# Patient Record
Sex: FEMALE | Race: WHITE | HISPANIC OR LATINO | URBAN - METROPOLITAN AREA
[De-identification: names, ages, dates, MRNs, and addresses within clinical notes are randomized per-mention and may not be internally consistent; named-entity substitution may affect disease eponyms.]

---

## 2018-10-29 ENCOUNTER — INPATIENT (INPATIENT)
Facility: HOSPITAL | Age: 83
LOS: 3 days | Discharge: NURSING, REHAB OR LONG-TERM CARE | End: 2018-11-02
Attending: INTERNAL MEDICINE | Admitting: INTERNAL MEDICINE
Payer: MEDICARE

## 2018-10-29 VITALS
OXYGEN SATURATION: 99 % | SYSTOLIC BLOOD PRESSURE: 121 MMHG | RESPIRATION RATE: 16 BRPM | HEART RATE: 49 BPM | DIASTOLIC BLOOD PRESSURE: 46 MMHG

## 2018-10-29 DIAGNOSIS — R74.8 ABNORMAL LEVELS OF OTHER SERUM ENZYMES: ICD-10-CM

## 2018-10-29 LAB
ALBUMIN SERPL ELPH-MCNC: 3.8 G/DL — SIGNIFICANT CHANGE UP (ref 3.3–5)
ALP SERPL-CCNC: 122 U/L — HIGH (ref 40–120)
ALT FLD-CCNC: 14 U/L — SIGNIFICANT CHANGE UP (ref 4–33)
APPEARANCE UR: CLEAR — SIGNIFICANT CHANGE UP
AST SERPL-CCNC: 28 U/L — SIGNIFICANT CHANGE UP (ref 4–32)
BACTERIA # UR AUTO: NEGATIVE — SIGNIFICANT CHANGE UP
BASE EXCESS BLDV CALC-SCNC: -0.2 MMOL/L — SIGNIFICANT CHANGE UP
BASOPHILS # BLD AUTO: 0.05 K/UL — SIGNIFICANT CHANGE UP (ref 0–0.2)
BASOPHILS NFR BLD AUTO: 0.3 % — SIGNIFICANT CHANGE UP (ref 0–2)
BILIRUB SERPL-MCNC: 0.4 MG/DL — SIGNIFICANT CHANGE UP (ref 0.2–1.2)
BILIRUB UR-MCNC: NEGATIVE — SIGNIFICANT CHANGE UP
BLOOD GAS VENOUS - CREATININE: 0.84 MG/DL — SIGNIFICANT CHANGE UP (ref 0.5–1.3)
BLOOD UR QL VISUAL: NEGATIVE — SIGNIFICANT CHANGE UP
BUN SERPL-MCNC: 17 MG/DL — SIGNIFICANT CHANGE UP (ref 7–23)
CALCIUM SERPL-MCNC: 9.9 MG/DL — SIGNIFICANT CHANGE UP (ref 8.4–10.5)
CHLORIDE BLDV-SCNC: 95 MMOL/L — LOW (ref 96–108)
CHLORIDE SERPL-SCNC: 90 MMOL/L — LOW (ref 98–107)
CK MB BLD-MCNC: 1.7 — SIGNIFICANT CHANGE UP (ref 0–2.5)
CK MB BLD-MCNC: 14.69 NG/ML — HIGH (ref 1–4.7)
CK SERPL-CCNC: 870 U/L — HIGH (ref 25–170)
CO2 SERPL-SCNC: 20 MMOL/L — LOW (ref 22–31)
COLOR SPEC: YELLOW — SIGNIFICANT CHANGE UP
CREAT SERPL-MCNC: 0.78 MG/DL — SIGNIFICANT CHANGE UP (ref 0.5–1.3)
EOSINOPHIL # BLD AUTO: 0.01 K/UL — SIGNIFICANT CHANGE UP (ref 0–0.5)
EOSINOPHIL NFR BLD AUTO: 0.1 % — SIGNIFICANT CHANGE UP (ref 0–6)
GAS PNL BLDV: 136 MMOL/L — SIGNIFICANT CHANGE UP (ref 136–146)
GLUCOSE BLDV-MCNC: 157 — HIGH (ref 70–99)
GLUCOSE SERPL-MCNC: 160 MG/DL — HIGH (ref 70–99)
GLUCOSE UR-MCNC: NEGATIVE — SIGNIFICANT CHANGE UP
HCO3 BLDV-SCNC: 23 MMOL/L — SIGNIFICANT CHANGE UP (ref 20–27)
HCT VFR BLD CALC: 39.2 % — SIGNIFICANT CHANGE UP (ref 34.5–45)
HCT VFR BLDV CALC: 38 % — SIGNIFICANT CHANGE UP (ref 34.5–45)
HGB BLD-MCNC: 12 G/DL — SIGNIFICANT CHANGE UP (ref 11.5–15.5)
HGB BLDV-MCNC: 12.4 G/DL — SIGNIFICANT CHANGE UP (ref 11.5–15.5)
HYALINE CASTS # UR AUTO: NEGATIVE — SIGNIFICANT CHANGE UP
IMM GRANULOCYTES # BLD AUTO: 0.11 # — SIGNIFICANT CHANGE UP
IMM GRANULOCYTES NFR BLD AUTO: 0.7 % — SIGNIFICANT CHANGE UP (ref 0–1.5)
KETONES UR-MCNC: HIGH
LACTATE BLDV-MCNC: 3 MMOL/L — HIGH (ref 0.5–2)
LEUKOCYTE ESTERASE UR-ACNC: NEGATIVE — SIGNIFICANT CHANGE UP
LYMPHOCYTES # BLD AUTO: 0.57 K/UL — LOW (ref 1–3.3)
LYMPHOCYTES # BLD AUTO: 3.7 % — LOW (ref 13–44)
MAGNESIUM SERPL-MCNC: 2 MG/DL — SIGNIFICANT CHANGE UP (ref 1.6–2.6)
MCHC RBC-ENTMCNC: 25.5 PG — LOW (ref 27–34)
MCHC RBC-ENTMCNC: 30.6 % — LOW (ref 32–36)
MCV RBC AUTO: 83.2 FL — SIGNIFICANT CHANGE UP (ref 80–100)
MONOCYTES # BLD AUTO: 0.55 K/UL — SIGNIFICANT CHANGE UP (ref 0–0.9)
MONOCYTES NFR BLD AUTO: 3.6 % — SIGNIFICANT CHANGE UP (ref 2–14)
NEUTROPHILS # BLD AUTO: 13.95 K/UL — HIGH (ref 1.8–7.4)
NEUTROPHILS NFR BLD AUTO: 91.6 % — HIGH (ref 43–77)
NITRITE UR-MCNC: NEGATIVE — SIGNIFICANT CHANGE UP
NRBC # FLD: 0 — SIGNIFICANT CHANGE UP
PCO2 BLDV: 41 MMHG — SIGNIFICANT CHANGE UP (ref 41–51)
PH BLDV: 7.39 PH — SIGNIFICANT CHANGE UP (ref 7.32–7.43)
PH UR: 6.5 — SIGNIFICANT CHANGE UP (ref 5–8)
PHOSPHATE SERPL-MCNC: 3.9 MG/DL — SIGNIFICANT CHANGE UP (ref 2.5–4.5)
PLATELET # BLD AUTO: 542 K/UL — HIGH (ref 150–400)
PMV BLD: 8.9 FL — SIGNIFICANT CHANGE UP (ref 7–13)
PO2 BLDV: < 24 MMHG — LOW (ref 35–40)
POTASSIUM BLDV-SCNC: 3.2 MMOL/L — LOW (ref 3.4–4.5)
POTASSIUM SERPL-MCNC: 3.3 MMOL/L — LOW (ref 3.5–5.3)
POTASSIUM SERPL-SCNC: 3.3 MMOL/L — LOW (ref 3.5–5.3)
PROT SERPL-MCNC: 7.8 G/DL — SIGNIFICANT CHANGE UP (ref 6–8.3)
PROT UR-MCNC: 200 — HIGH
RBC # BLD: 4.71 M/UL — SIGNIFICANT CHANGE UP (ref 3.8–5.2)
RBC # FLD: 14.2 % — SIGNIFICANT CHANGE UP (ref 10.3–14.5)
RBC CASTS # UR COMP ASSIST: SIGNIFICANT CHANGE UP (ref 0–?)
SAO2 % BLDV: 18 % — LOW (ref 60–85)
SODIUM SERPL-SCNC: 135 MMOL/L — SIGNIFICANT CHANGE UP (ref 135–145)
SP GR SPEC: 1.02 — SIGNIFICANT CHANGE UP (ref 1–1.04)
SQUAMOUS # UR AUTO: SIGNIFICANT CHANGE UP
TROPONIN T, HIGH SENSITIVITY: 51 NG/L — SIGNIFICANT CHANGE UP (ref ?–14)
TROPONIN T, HIGH SENSITIVITY: 59 NG/L — CRITICAL HIGH (ref ?–14)
UROBILINOGEN FLD QL: NORMAL — SIGNIFICANT CHANGE UP
WBC # BLD: 15.24 K/UL — HIGH (ref 3.8–10.5)
WBC # FLD AUTO: 15.24 K/UL — HIGH (ref 3.8–10.5)
WBC UR QL: SIGNIFICANT CHANGE UP (ref 0–?)

## 2018-10-29 PROCEDURE — 72125 CT NECK SPINE W/O DYE: CPT | Mod: 26

## 2018-10-29 PROCEDURE — 71046 X-RAY EXAM CHEST 2 VIEWS: CPT | Mod: 26

## 2018-10-29 PROCEDURE — 70450 CT HEAD/BRAIN W/O DYE: CPT | Mod: 26

## 2018-10-29 RX ORDER — SODIUM CHLORIDE 9 MG/ML
1000 INJECTION INTRAMUSCULAR; INTRAVENOUS; SUBCUTANEOUS ONCE
Qty: 0 | Refills: 0 | Status: COMPLETED | OUTPATIENT
Start: 2018-10-29 | End: 2018-10-29

## 2018-10-29 RX ADMIN — SODIUM CHLORIDE 2000 MILLILITER(S): 9 INJECTION INTRAMUSCULAR; INTRAVENOUS; SUBCUTANEOUS at 15:16

## 2018-10-29 NOTE — ED ADULT NURSE NOTE - OBJECTIVE STATEMENT
Pt is an 86 year old female reporting a fall. Pt reports falling last night, was unable to get up and was found by family member this morning on the floor. Pt reports her legs "feeling weak then falling". Pt denies LOC, or hitting her head. Pt has a PMH of cva and breast cancer in the left breast. Pt reports left side weakness after CVA. Pt AO to place, situation but not time. Pt airway is patent, respirations are even. Pt denies any SOB, cheat pain. Pt hand grasp unequal, left hand weaker than right. Pt plantar flex and dorsal flex weak. Pt able to lift legs and hold for less than five seconds. Pt sensation intact, denies any numbness or tingling in extremities. Pt stage one pressure ulcer on coccyx, 2x3 inches, red. 20 g iv placed in right ac, labs drawn, pending review. Pt placed on cardiac monitor, will continue to monitor.

## 2018-10-29 NOTE — ED ADULT NURSE NOTE - NSIMPLEMENTINTERV_GEN_ALL_ED
Implemented All Fall with Harm Risk Interventions:  [place] to call system. Call bell, personal items and telephone within reach. Instruct patient to call for assistance. Room bathroom lighting operational. Non-slip footwear when patient is off stretcher. Physically safe environment: no spills, clutter or unnecessary equipment. Stretcher in lowest position, wheels locked, appropriate side rails in place. Provide visual cue, wrist band, yellow gown, etc. Monitor gait and stability. Monitor for mental status changes and reorient to person, place, and time. Review medications for side effects contributing to fall risk. Reinforce activity limits and safety measures with patient and family. Provide visual clues: red socks.

## 2018-10-29 NOTE — ED PROVIDER NOTE - ATTENDING CONTRIBUTION TO CARE
AJM: Patient seen with resident and agree with above note. 86F hx stroke with LUE deficits, htn not on blood thinners presents with fall last night. Pt says she lives alone and uses a walker at baseline. She fell when she got up from couch without walker, did not hit head or LOC and stayed down till the next morning 9AM when her brother who lives 5 blocks away found her. She denies any focal weakness, dizziness, palpitations, cp, sob before the fall. Also does not endorse pain anywhere, recent fevers/chills, dysuria, abdominal pain, diarrhea or n/v. will obtain labs, xrays, trop, ecg. possible admit

## 2018-10-29 NOTE — ED PROVIDER NOTE - OBJECTIVE STATEMENT
86F hx stroke with LUE deficits, htn not on blood thinners presents with fall last night. Pt says she lives alone and uses a walker at baseline. She fell when she got up from couch without walker, did not hit head or LOC and stayed down till the next morning 9AM when her brother who lives 5 blocks away found her. She denies any focal weakness, dizziness, palpitations, cp, sob before the fall. Also does not endorse pain anywhere, recent fevers/chills, dysuria, abdominal pain, diarrhea or n/v.

## 2018-10-29 NOTE — ED ADULT TRIAGE NOTE - CHIEF COMPLAINT QUOTE
pt BIBA from home, pt was found on floor this am.  pt c/o weakness to BLE.  unknown how long pt was on the floor.  PMH: breast ca, CVA.  unable to obtain temp in triage

## 2018-10-29 NOTE — ED PROVIDER NOTE - NS ED ROS FT
Constitutional: no fevers, no chills.  Eyes: no visual changes.  Ears: no ear drainage, no ear pain.  Nose: no nasal congestion.  Mouth/Throat: no sore throat.  Cardiovascular: no chest pain.  Respiratory: no shortness of breath, no wheezing, no cough  Gastrointestinal: no nausea, no vomiting, no diarrhea, no abdominal pain.  MSK: no flank pain, no back pain. +fall   Genitourinary: no dysuria, no hematuria.  Skin: no rashes.  Neuro: no headache,   Psychiatric: no known mental health issues.

## 2018-10-29 NOTE — ED ADULT NURSE NOTE - CHPI ED NUR SYMPTOMS NEG
no bleeding/no vomiting/no tingling/no abrasion/no loss of consciousness/no numbness/no fever/no deformity

## 2018-10-29 NOTE — ED ADULT NURSE REASSESSMENT NOTE - NS ED NURSE REASSESS COMMENT FT1
Patient A&Ox2 awaiting cardio consult tba, on montior in NAD, repositioned assisted to bedpan but unable to urinate, abdomen soft and non-distended, diaper changed, mepilix applied over bony prominence in sacrum skin intact appears to have Stage 1 pressure.

## 2018-10-29 NOTE — ED PROVIDER NOTE - PHYSICAL EXAMINATION
GEN: Well appearing, well nourished, in no apparent distress.  HEAD: NCAT  HEENT: L facial fold more flattened than R, PERRL, EOMI, no nystagmus, Airway patent, uvula midline, MMM, neck supple, no LAD, no JVD  LUNG: CTAB, no adventitious sounds, no retractions, no nasal flaring  CV: RRR, no murmurs,   Abd: soft, NTND, no rebound or guarding, BS+ in all quadrants, no CVAT  MSK: WWP, Pulses 2+ in extremities, No edema, no visible deformities, strength 5/5 in all extremities except 3/5 in LUE.   Neuro:  CN II-XII in tact. AAOx3, non-ambulatory. sensations in tact in all extremities, neg pronator drift, neg finger to nose,   Skin: Warm and dry, no evidence of rash  Psych: normal mood and affect

## 2018-10-29 NOTE — ED PROVIDER NOTE - MEDICAL DECISION MAKING DETAILS
CT head and spine to r/o bleed and fracture. Will get CK and Ua as suspicious of dehydration and rhabdo - will require ivf. EKG and trops to look for cardiac e/o. Cardiology will see pt and then we can admit for delta trops. Pending CTH and neck.

## 2018-10-29 NOTE — ED PROVIDER NOTE - CARE PLAN
Principal Discharge DX:	Troponin level elevated  Secondary Diagnosis:	Traumatic rhabdomyolysis, initial encounter
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
## 2018-10-30 DIAGNOSIS — T79.6XXA TRAUMATIC ISCHEMIA OF MUSCLE, INITIAL ENCOUNTER: ICD-10-CM

## 2018-10-30 DIAGNOSIS — R74.8 ABNORMAL LEVELS OF OTHER SERUM ENZYMES: ICD-10-CM

## 2018-10-30 DIAGNOSIS — Z90.12 ACQUIRED ABSENCE OF LEFT BREAST AND NIPPLE: Chronic | ICD-10-CM

## 2018-10-30 DIAGNOSIS — E78.5 HYPERLIPIDEMIA, UNSPECIFIED: ICD-10-CM

## 2018-10-30 DIAGNOSIS — Z79.899 OTHER LONG TERM (CURRENT) DRUG THERAPY: ICD-10-CM

## 2018-10-30 DIAGNOSIS — D72.829 ELEVATED WHITE BLOOD CELL COUNT, UNSPECIFIED: ICD-10-CM

## 2018-10-30 DIAGNOSIS — H26.40 UNSPECIFIED SECONDARY CATARACT: Chronic | ICD-10-CM

## 2018-10-30 DIAGNOSIS — Z29.9 ENCOUNTER FOR PROPHYLACTIC MEASURES, UNSPECIFIED: ICD-10-CM

## 2018-10-30 DIAGNOSIS — I10 ESSENTIAL (PRIMARY) HYPERTENSION: ICD-10-CM

## 2018-10-30 LAB
BUN SERPL-MCNC: 20 MG/DL — SIGNIFICANT CHANGE UP (ref 7–23)
CALCIUM SERPL-MCNC: 9.2 MG/DL — SIGNIFICANT CHANGE UP (ref 8.4–10.5)
CHLORIDE SERPL-SCNC: 96 MMOL/L — LOW (ref 98–107)
CHOLEST SERPL-MCNC: 186 MG/DL — SIGNIFICANT CHANGE UP (ref 120–199)
CK MB BLD-MCNC: 1.5 — SIGNIFICANT CHANGE UP (ref 0–2.5)
CK MB BLD-MCNC: 10.23 NG/ML — HIGH (ref 1–4.7)
CK SERPL-CCNC: 685 U/L — HIGH (ref 25–170)
CO2 SERPL-SCNC: 23 MMOL/L — SIGNIFICANT CHANGE UP (ref 22–31)
CREAT SERPL-MCNC: 0.72 MG/DL — SIGNIFICANT CHANGE UP (ref 0.5–1.3)
GLUCOSE SERPL-MCNC: 93 MG/DL — SIGNIFICANT CHANGE UP (ref 70–99)
HBA1C BLD-MCNC: 5.9 % — HIGH (ref 4–5.6)
HCT VFR BLD CALC: 33.3 % — LOW (ref 34.5–45)
HDLC SERPL-MCNC: 55 MG/DL — SIGNIFICANT CHANGE UP (ref 45–65)
HGB BLD-MCNC: 10.4 G/DL — LOW (ref 11.5–15.5)
LIPID PNL WITH DIRECT LDL SERPL: 119 MG/DL — SIGNIFICANT CHANGE UP
MAGNESIUM SERPL-MCNC: 2.1 MG/DL — SIGNIFICANT CHANGE UP (ref 1.6–2.6)
MCHC RBC-ENTMCNC: 25.3 PG — LOW (ref 27–34)
MCHC RBC-ENTMCNC: 31.2 % — LOW (ref 32–36)
MCV RBC AUTO: 81 FL — SIGNIFICANT CHANGE UP (ref 80–100)
NRBC # FLD: 0 — SIGNIFICANT CHANGE UP
PHOSPHATE SERPL-MCNC: 3 MG/DL — SIGNIFICANT CHANGE UP (ref 2.5–4.5)
PLATELET # BLD AUTO: 524 K/UL — HIGH (ref 150–400)
PMV BLD: 8.9 FL — SIGNIFICANT CHANGE UP (ref 7–13)
POTASSIUM SERPL-MCNC: 3.2 MMOL/L — LOW (ref 3.5–5.3)
POTASSIUM SERPL-SCNC: 3.2 MMOL/L — LOW (ref 3.5–5.3)
RBC # BLD: 4.11 M/UL — SIGNIFICANT CHANGE UP (ref 3.8–5.2)
RBC # FLD: 14.5 % — SIGNIFICANT CHANGE UP (ref 10.3–14.5)
SODIUM SERPL-SCNC: 138 MMOL/L — SIGNIFICANT CHANGE UP (ref 135–145)
TRIGL SERPL-MCNC: 97 MG/DL — SIGNIFICANT CHANGE UP (ref 10–149)
TROPONIN T, HIGH SENSITIVITY: 46 NG/L — SIGNIFICANT CHANGE UP (ref ?–14)
TSH SERPL-MCNC: 2.51 UIU/ML — SIGNIFICANT CHANGE UP (ref 0.27–4.2)
WBC # BLD: 10.06 K/UL — SIGNIFICANT CHANGE UP (ref 3.8–10.5)
WBC # FLD AUTO: 10.06 K/UL — SIGNIFICANT CHANGE UP (ref 3.8–10.5)

## 2018-10-30 RX ORDER — HEPARIN SODIUM 5000 [USP'U]/ML
5000 INJECTION INTRAVENOUS; SUBCUTANEOUS EVERY 12 HOURS
Qty: 0 | Refills: 0 | Status: DISCONTINUED | OUTPATIENT
Start: 2018-10-30 | End: 2018-11-02

## 2018-10-30 RX ORDER — SIMVASTATIN 20 MG/1
20 TABLET, FILM COATED ORAL AT BEDTIME
Qty: 0 | Refills: 0 | Status: DISCONTINUED | OUTPATIENT
Start: 2018-10-30 | End: 2018-11-02

## 2018-10-30 RX ORDER — SODIUM CHLORIDE 9 MG/ML
1000 INJECTION INTRAMUSCULAR; INTRAVENOUS; SUBCUTANEOUS
Qty: 0 | Refills: 0 | Status: DISCONTINUED | OUTPATIENT
Start: 2018-10-30 | End: 2018-11-02

## 2018-10-30 RX ORDER — HYDROCHLOROTHIAZIDE 25 MG
25 TABLET ORAL DAILY
Qty: 0 | Refills: 0 | Status: DISCONTINUED | OUTPATIENT
Start: 2018-10-30 | End: 2018-11-02

## 2018-10-30 RX ORDER — POTASSIUM CHLORIDE 20 MEQ
40 PACKET (EA) ORAL EVERY 4 HOURS
Qty: 0 | Refills: 0 | Status: COMPLETED | OUTPATIENT
Start: 2018-10-30 | End: 2018-10-30

## 2018-10-30 RX ORDER — INFLUENZA VIRUS VACCINE 15; 15; 15; 15 UG/.5ML; UG/.5ML; UG/.5ML; UG/.5ML
0.5 SUSPENSION INTRAMUSCULAR ONCE
Qty: 0 | Refills: 0 | Status: COMPLETED | OUTPATIENT
Start: 2018-10-30 | End: 2018-11-02

## 2018-10-30 RX ORDER — POTASSIUM CHLORIDE 20 MEQ
1 PACKET (EA) ORAL
Qty: 0 | Refills: 0 | COMMUNITY

## 2018-10-30 RX ORDER — SODIUM CHLORIDE 9 MG/ML
3 INJECTION INTRAMUSCULAR; INTRAVENOUS; SUBCUTANEOUS EVERY 8 HOURS
Qty: 0 | Refills: 0 | Status: DISCONTINUED | OUTPATIENT
Start: 2018-10-30 | End: 2018-11-02

## 2018-10-30 RX ORDER — METOPROLOL TARTRATE 50 MG
25 TABLET ORAL DAILY
Qty: 0 | Refills: 0 | Status: DISCONTINUED | OUTPATIENT
Start: 2018-10-30 | End: 2018-10-31

## 2018-10-30 RX ORDER — POTASSIUM CHLORIDE 20 MEQ
10 PACKET (EA) ORAL DAILY
Qty: 0 | Refills: 0 | Status: DISCONTINUED | OUTPATIENT
Start: 2018-10-30 | End: 2018-11-02

## 2018-10-30 RX ADMIN — SODIUM CHLORIDE 3 MILLILITER(S): 9 INJECTION INTRAMUSCULAR; INTRAVENOUS; SUBCUTANEOUS at 15:29

## 2018-10-30 RX ADMIN — SODIUM CHLORIDE 75 MILLILITER(S): 9 INJECTION INTRAMUSCULAR; INTRAVENOUS; SUBCUTANEOUS at 16:14

## 2018-10-30 RX ADMIN — Medication 40 MILLIEQUIVALENT(S): at 11:31

## 2018-10-30 RX ADMIN — Medication 25 MILLIGRAM(S): at 20:28

## 2018-10-30 RX ADMIN — SODIUM CHLORIDE 3 MILLILITER(S): 9 INJECTION INTRAMUSCULAR; INTRAVENOUS; SUBCUTANEOUS at 22:30

## 2018-10-30 RX ADMIN — SODIUM CHLORIDE 3 MILLILITER(S): 9 INJECTION INTRAMUSCULAR; INTRAVENOUS; SUBCUTANEOUS at 07:12

## 2018-10-30 RX ADMIN — HEPARIN SODIUM 5000 UNIT(S): 5000 INJECTION INTRAVENOUS; SUBCUTANEOUS at 07:12

## 2018-10-30 RX ADMIN — Medication 40 MILLIEQUIVALENT(S): at 15:42

## 2018-10-30 RX ADMIN — SIMVASTATIN 20 MILLIGRAM(S): 20 TABLET, FILM COATED ORAL at 22:43

## 2018-10-30 RX ADMIN — HEPARIN SODIUM 5000 UNIT(S): 5000 INJECTION INTRAVENOUS; SUBCUTANEOUS at 17:24

## 2018-10-30 RX ADMIN — Medication 10 MILLIEQUIVALENT(S): at 17:24

## 2018-10-30 NOTE — PHYSICAL THERAPY INITIAL EVALUATION ADULT - ADDITIONAL COMMENTS
Patient reports she lives alone in a private house, 5 steps to enter. Patient reports she was previously independent in all ADLs (although mentioned her brother would come over to help) and ambulated with a cane & rolling walker prior to admission.     Patient was left semi-supine in bed as found, all lines/tubes intact and call richey within reach, RHONA guillen

## 2018-10-30 NOTE — PHYSICAL THERAPY INITIAL EVALUATION ADULT - PATIENT PROFILE REVIEW, REHAB EVAL
PT orders received-->no formal activity order. Consult with RHONA Argueta V -->pt OK to participate in PT evaluation./yes

## 2018-10-30 NOTE — H&P ADULT - PROBLEM SELECTOR PLAN 1
Admit to tele  check cbc, bmp, a1c, flp, tsh, trend CE  Echo ordered   fall risk  PT  check orthostatics

## 2018-10-30 NOTE — H&P ADULT - ATTENDING COMMENTS
Pt seen and examined at bedside. Agree with assessment and plan   admitted with fall and rhabdomyolysis   Gentle iVF   trend Trops and CE   Check TTE   Orthostatics   Tele monitoring

## 2018-10-30 NOTE — PHYSICAL THERAPY INITIAL EVALUATION ADULT - PERTINENT HX OF CURRENT PROBLEM, REHAB EVAL
Patient is an 86 year old female admitted to [facility] on 10/29 s/p fall at home. PMH includes: HTN, HLD, CVA with LUE weakness, breast ca s/p L mastectomy. CTH negative for acute infarct.  Cervical spine CT: No evidence for acute displaced fracture or traumatic malalignment.

## 2018-10-30 NOTE — H&P ADULT - NSHPLABSRESULTS_GEN_ALL_CORE
EKG NSR 86 BPM, QTC: 624                          12.0   15.24 )-----------( 542      ( 29 Oct 2018 15:00 )             39.2     10-29    135  |  90<L>  |  17  ----------------------------<  160<H>  3.3<L>   |  20<L>  |  0.78    Ca    9.9      29 Oct 2018 15:00  Phos  3.9     10-29  Mg     2.0     10-29    TPro  7.8  /  Alb  3.8  /  TBili  0.4  /  DBili  x   /  AST  28  /  ALT  14  /  AlkPhos  122<H>  10-29    Troponin T, High Sensitivity: 51: ---------------------***PLEASE NOTE***----------------------  Rapid changes upward or downward in high-sensitivity  troponin levels strongly suggest acute myocardial injury.  Hemolysis may falsely lower results. Renal impairment may  increase results.    Normal: <6 - 14 ng/L  Indeterminate: 15 - 51 ng/L  Elevated: >51 ng/L    Please see "[email or web address] on the [employer]  intranet for more information. ng/L (10.29.18 @ 16:23)    < from: CT Cervical Spine No Cont (10.29.18 @ 18:31) >      Head CT: No evidence for intracranial hemorrhage, mass effect, or   displaced calvarial fracture.    Cervical spine CT: No evidence for acute displaced fracture or traumatic   malalignment. Cervical degenerative spondylosis, as described above. MRI   can be performed if there is concern for ligamentous or cord injury, and   if there are no MRI contraindications.    < end of copied text >

## 2018-10-31 LAB
ALBUMIN SERPL ELPH-MCNC: 3.3 G/DL — SIGNIFICANT CHANGE UP (ref 3.3–5)
ALP SERPL-CCNC: 96 U/L — SIGNIFICANT CHANGE UP (ref 40–120)
ALT FLD-CCNC: 11 U/L — SIGNIFICANT CHANGE UP (ref 4–33)
AST SERPL-CCNC: 25 U/L — SIGNIFICANT CHANGE UP (ref 4–32)
BACTERIA UR CULT: SIGNIFICANT CHANGE UP
BILIRUB DIRECT SERPL-MCNC: 0.1 MG/DL — SIGNIFICANT CHANGE UP (ref 0.1–0.2)
BILIRUB SERPL-MCNC: 0.4 MG/DL — SIGNIFICANT CHANGE UP (ref 0.2–1.2)
BLD GP AB SCN SERPL QL: NEGATIVE — SIGNIFICANT CHANGE UP
BUN SERPL-MCNC: 13 MG/DL — SIGNIFICANT CHANGE UP (ref 7–23)
CALCIUM SERPL-MCNC: 9.2 MG/DL — SIGNIFICANT CHANGE UP (ref 8.4–10.5)
CHLORIDE SERPL-SCNC: 94 MMOL/L — LOW (ref 98–107)
CK SERPL-CCNC: 364 U/L — HIGH (ref 25–170)
CO2 SERPL-SCNC: 21 MMOL/L — LOW (ref 22–31)
CREAT SERPL-MCNC: 0.64 MG/DL — SIGNIFICANT CHANGE UP (ref 0.5–1.3)
GLUCOSE SERPL-MCNC: 115 MG/DL — HIGH (ref 70–99)
HCT VFR BLD CALC: 37.5 % — SIGNIFICANT CHANGE UP (ref 34.5–45)
HGB BLD-MCNC: 11.6 G/DL — SIGNIFICANT CHANGE UP (ref 11.5–15.5)
MAGNESIUM SERPL-MCNC: 2 MG/DL — SIGNIFICANT CHANGE UP (ref 1.6–2.6)
MCHC RBC-ENTMCNC: 25.7 PG — LOW (ref 27–34)
MCHC RBC-ENTMCNC: 30.9 % — LOW (ref 32–36)
MCV RBC AUTO: 83.1 FL — SIGNIFICANT CHANGE UP (ref 80–100)
NRBC # FLD: 0 — SIGNIFICANT CHANGE UP
PLATELET # BLD AUTO: 532 K/UL — HIGH (ref 150–400)
PMV BLD: 8.8 FL — SIGNIFICANT CHANGE UP (ref 7–13)
POTASSIUM SERPL-MCNC: 3.1 MMOL/L — LOW (ref 3.5–5.3)
POTASSIUM SERPL-SCNC: 3.1 MMOL/L — LOW (ref 3.5–5.3)
PROT SERPL-MCNC: 6.9 G/DL — SIGNIFICANT CHANGE UP (ref 6–8.3)
RBC # BLD: 4.51 M/UL — SIGNIFICANT CHANGE UP (ref 3.8–5.2)
RBC # FLD: 14.6 % — HIGH (ref 10.3–14.5)
RH IG SCN BLD-IMP: POSITIVE — SIGNIFICANT CHANGE UP
SODIUM SERPL-SCNC: 135 MMOL/L — SIGNIFICANT CHANGE UP (ref 135–145)
SPECIMEN SOURCE: SIGNIFICANT CHANGE UP
WBC # BLD: 10.75 K/UL — HIGH (ref 3.8–10.5)
WBC # FLD AUTO: 10.75 K/UL — HIGH (ref 3.8–10.5)

## 2018-10-31 RX ORDER — ACETAMINOPHEN 500 MG
650 TABLET ORAL ONCE
Qty: 0 | Refills: 0 | Status: COMPLETED | OUTPATIENT
Start: 2018-10-31 | End: 2018-10-31

## 2018-10-31 RX ORDER — METOPROLOL TARTRATE 50 MG
25 TABLET ORAL ONCE
Qty: 0 | Refills: 0 | Status: COMPLETED | OUTPATIENT
Start: 2018-10-31 | End: 2018-10-31

## 2018-10-31 RX ORDER — POTASSIUM CHLORIDE 20 MEQ
40 PACKET (EA) ORAL EVERY 4 HOURS
Qty: 0 | Refills: 0 | Status: COMPLETED | OUTPATIENT
Start: 2018-10-31 | End: 2018-10-31

## 2018-10-31 RX ORDER — METOPROLOL TARTRATE 50 MG
50 TABLET ORAL DAILY
Qty: 0 | Refills: 0 | Status: DISCONTINUED | OUTPATIENT
Start: 2018-10-31 | End: 2018-11-02

## 2018-10-31 RX ADMIN — Medication 650 MILLIGRAM(S): at 13:00

## 2018-10-31 RX ADMIN — SODIUM CHLORIDE 3 MILLILITER(S): 9 INJECTION INTRAMUSCULAR; INTRAVENOUS; SUBCUTANEOUS at 05:57

## 2018-10-31 RX ADMIN — Medication 650 MILLIGRAM(S): at 22:45

## 2018-10-31 RX ADMIN — Medication 10 MILLIEQUIVALENT(S): at 12:07

## 2018-10-31 RX ADMIN — Medication 650 MILLIGRAM(S): at 21:46

## 2018-10-31 RX ADMIN — Medication 25 MILLIGRAM(S): at 06:11

## 2018-10-31 RX ADMIN — SIMVASTATIN 20 MILLIGRAM(S): 20 TABLET, FILM COATED ORAL at 21:46

## 2018-10-31 RX ADMIN — Medication 25 MILLIGRAM(S): at 06:06

## 2018-10-31 RX ADMIN — SODIUM CHLORIDE 3 MILLILITER(S): 9 INJECTION INTRAMUSCULAR; INTRAVENOUS; SUBCUTANEOUS at 21:46

## 2018-10-31 RX ADMIN — Medication 650 MILLIGRAM(S): at 12:06

## 2018-10-31 RX ADMIN — HEPARIN SODIUM 5000 UNIT(S): 5000 INJECTION INTRAVENOUS; SUBCUTANEOUS at 06:07

## 2018-10-31 RX ADMIN — HEPARIN SODIUM 5000 UNIT(S): 5000 INJECTION INTRAVENOUS; SUBCUTANEOUS at 19:10

## 2018-10-31 RX ADMIN — Medication 40 MILLIEQUIVALENT(S): at 09:52

## 2018-10-31 RX ADMIN — SODIUM CHLORIDE 3 MILLILITER(S): 9 INJECTION INTRAMUSCULAR; INTRAVENOUS; SUBCUTANEOUS at 14:36

## 2018-10-31 RX ADMIN — Medication 25 MILLIGRAM(S): at 10:01

## 2018-10-31 RX ADMIN — Medication 40 MILLIEQUIVALENT(S): at 06:06

## 2018-10-31 NOTE — CONSULT NOTE ADULT - ASSESSMENT
85 yo female h/o cva, htn, chol, breast ca admitted s/p fall with rhabdo  r/o syncope      no evid of acs  f/u echo  tele monitor  check orthostatics  cards f/u  brain ct neg    rhabdo  improving with ivf    cont other prior home meds    PT

## 2018-10-31 NOTE — CONSULT NOTE ADULT - SUBJECTIVE AND OBJECTIVE BOX
FELIPE JOSEPH  86y Female  MRN:[id number]    Patient is a 86y old  Female who presents with a chief complaint of fall (30 Oct 2018 05:42)    HPI:  85 y/o F with hx of HTN, HLD, CVA with LUE weakness, breast ca s/p L mastectomy presents with fall. She got up from the couch without the walker and fell to the floor. She did not hit head or had LOC. She was unable to get up from there and stayed on the floor until the AM when her brother found her. Denies fever, chills, cough, chest pain, sob, abdominal pain, nausea, vomiting, melena, hematochezia, LE edema, or dysuria. (30 Oct 2018 05:42)      Patient seen and evaluated at bedside.      Interval HPI: no acute events o/n    PAST MEDICAL & SURGICAL HISTORY:  Breast cancer  Hyperlipidemia  Hypertension  Cerebrovascular accident (CVA)  After cataract  H/O left mastectomy    SOC:  non smoker  no alcohol or drug abuse    fam hx:  non cont    REVIEW OF SYSTEMS:  as per hpi    VITALS:  Vital Signs Last 24 Hrs  T(C): 37.4 (31 Oct 2018 05:55), Max: 37.4 (31 Oct 2018 05:55)  T(F): 99.3 (31 Oct 2018 05:55), Max: 99.3 (31 Oct 2018 05:55)  HR: 94 (31 Oct 2018 10:00) (90 - 110)  BP: 148/84 (31 Oct 2018 10:00) (121/67 - 151/85)  BP(mean): 131 (31 Oct 2018 05:55) (131 - 131)  RR: 18 (31 Oct 2018 10:00) (17 - 18)  SpO2: 97% (31 Oct 2018 10:00) (95% - 99%)  CAPILLARY BLOOD GLUCOSE        I&O's Summary      PHYSICAL EXAM:  GENERAL: NAD, well-developed  HEAD:  Atraumatic, Normocephalic  EYES: EOMI, PERRLA, conjunctiva and sclera clear  NECK: Supple, No JVD  CHEST/LUNG: Clear to auscultation bilaterally; No wheeze  HEART: S1, S2; No murmurs, rubs, or gallops  ABDOMEN: Soft, Nontender, Nondistended; Bowel sounds present  EXTREMITIES:  2+ Peripheral Pulses, No clubbing, cyanosis, or edema  PSYCH: Normal affect  NEUROLOGY: AAOX3; LUE weakness  SKIN: No rashes or lesions    Consultant(s) Notes Reviewed:  [x ] YES  [ ] NO  Care Discussed with Consultants/Other Providers [ x] YES  [ ] NO    MEDS:  MEDICATIONS  (STANDING):  heparin  Injectable 5000 Unit(s) SubCutaneous every 12 hours  hydrochlorothiazide 25 milliGRAM(s) Oral daily  influenza   Vaccine 0.5 milliLiter(s) IntraMuscular once  metoprolol succinate ER 50 milliGRAM(s) Oral daily  potassium chloride    Tablet ER 10 milliEquivalent(s) Oral daily  simvastatin 20 milliGRAM(s) Oral at bedtime  sodium chloride 0.9% lock flush 3 milliLiter(s) IV Push every 8 hours  sodium chloride 0.9%. 1000 milliLiter(s) (75 mL/Hr) IV Continuous <Continuous>    MEDICATIONS  (PRN):    ALLERGIES:  penicillin (Unknown)      LABS:                        11.6   10.75 )-----------( 532      ( 31 Oct 2018 02:20 )             37.5     10-31    135  |  94<L>  |  13  ----------------------------<  115<H>  3.1<L>   |  21<L>  |  0.64    Ca    9.2      31 Oct 2018 02:20  Phos  3.0     10-  Mg     2.0     10-31    TPro  6.9  /  Alb  3.3  /  TBili  0.4  /  DBili  0.1  /  AST  25  /  ALT  11  /  AlkPhos  96  10-31      CARDIAC MARKERS ( 31 Oct 2018 02:20 )  x     / x     / 364 u/L / x     / x      CARDIAC MARKERS ( 30 Oct 2018 05:30 )  x     / x     / 685 u/L / 10.23 ng/mL / x      CARDIAC MARKERS ( 29 Oct 2018 15:00 )  x     / x     / 870 u/L / 14.69 ng/mL / x          LIVER FUNCTIONS - ( 31 Oct 2018 02:20 )  Alb: 3.3 g/dL / Pro: 6.9 g/dL / ALK PHOS: 96 u/L / ALT: 11 u/L / AST: 25 u/L / GGT: x           Urinalysis Basic - ( 29 Oct 2018 15:30 )    Color: YELLOW / Appearance: CLEAR / S.019 / pH: 6.5  Gluc: NEGATIVE / Ketone: MODERATE  / Bili: NEGATIVE / Urobili: NORMAL   Blood: NEGATIVE / Protein: 200 / Nitrite: NEGATIVE   Leuk Esterase: NEGATIVE / RBC: 3-5 / WBC 0-2   Sq Epi: OCC / Non Sq Epi: x / Bacteria: NEGATIVE      TSH:   A1c:  BNP:  Lipid panel:   cultures:     RADIOLOGY & ADDITIONAL TESTS:    Imaging Personally Reviewed:  [ ] YES  [ ] NO

## 2018-11-01 LAB
BASOPHILS # BLD AUTO: 0.05 K/UL — SIGNIFICANT CHANGE UP (ref 0–0.2)
BASOPHILS NFR BLD AUTO: 0.5 % — SIGNIFICANT CHANGE UP (ref 0–2)
BUN SERPL-MCNC: 10 MG/DL — SIGNIFICANT CHANGE UP (ref 7–23)
CALCIUM SERPL-MCNC: 8.7 MG/DL — SIGNIFICANT CHANGE UP (ref 8.4–10.5)
CHLORIDE SERPL-SCNC: 100 MMOL/L — SIGNIFICANT CHANGE UP (ref 98–107)
CO2 SERPL-SCNC: 24 MMOL/L — SIGNIFICANT CHANGE UP (ref 22–31)
CREAT SERPL-MCNC: 0.63 MG/DL — SIGNIFICANT CHANGE UP (ref 0.5–1.3)
EOSINOPHIL # BLD AUTO: 0.04 K/UL — SIGNIFICANT CHANGE UP (ref 0–0.5)
EOSINOPHIL NFR BLD AUTO: 0.4 % — SIGNIFICANT CHANGE UP (ref 0–6)
GLUCOSE SERPL-MCNC: 109 MG/DL — HIGH (ref 70–99)
HCT VFR BLD CALC: 34.7 % — SIGNIFICANT CHANGE UP (ref 34.5–45)
HGB BLD-MCNC: 11 G/DL — LOW (ref 11.5–15.5)
IMM GRANULOCYTES # BLD AUTO: 0.08 # — SIGNIFICANT CHANGE UP
IMM GRANULOCYTES NFR BLD AUTO: 0.8 % — SIGNIFICANT CHANGE UP (ref 0–1.5)
LYMPHOCYTES # BLD AUTO: 0.88 K/UL — LOW (ref 1–3.3)
LYMPHOCYTES # BLD AUTO: 8.3 % — LOW (ref 13–44)
MCHC RBC-ENTMCNC: 25.2 PG — LOW (ref 27–34)
MCHC RBC-ENTMCNC: 31.7 % — LOW (ref 32–36)
MCV RBC AUTO: 79.6 FL — LOW (ref 80–100)
MONOCYTES # BLD AUTO: 0.8 K/UL — SIGNIFICANT CHANGE UP (ref 0–0.9)
MONOCYTES NFR BLD AUTO: 7.6 % — SIGNIFICANT CHANGE UP (ref 2–14)
NEUTROPHILS # BLD AUTO: 8.73 K/UL — HIGH (ref 1.8–7.4)
NEUTROPHILS NFR BLD AUTO: 82.4 % — HIGH (ref 43–77)
NRBC # FLD: 0 — SIGNIFICANT CHANGE UP
PLATELET # BLD AUTO: 492 K/UL — HIGH (ref 150–400)
PMV BLD: 9.1 FL — SIGNIFICANT CHANGE UP (ref 7–13)
POTASSIUM SERPL-MCNC: 3 MMOL/L — LOW (ref 3.5–5.3)
POTASSIUM SERPL-SCNC: 3 MMOL/L — LOW (ref 3.5–5.3)
RBC # BLD: 4.36 M/UL — SIGNIFICANT CHANGE UP (ref 3.8–5.2)
RBC # FLD: 14.4 % — SIGNIFICANT CHANGE UP (ref 10.3–14.5)
SODIUM SERPL-SCNC: 137 MMOL/L — SIGNIFICANT CHANGE UP (ref 135–145)
WBC # BLD: 10.58 K/UL — HIGH (ref 3.8–10.5)
WBC # FLD AUTO: 10.58 K/UL — HIGH (ref 3.8–10.5)

## 2018-11-01 RX ORDER — POTASSIUM CHLORIDE 20 MEQ
40 PACKET (EA) ORAL EVERY 4 HOURS
Qty: 0 | Refills: 0 | Status: COMPLETED | OUTPATIENT
Start: 2018-11-01 | End: 2018-11-01

## 2018-11-01 RX ADMIN — HEPARIN SODIUM 5000 UNIT(S): 5000 INJECTION INTRAVENOUS; SUBCUTANEOUS at 17:56

## 2018-11-01 RX ADMIN — SODIUM CHLORIDE 3 MILLILITER(S): 9 INJECTION INTRAMUSCULAR; INTRAVENOUS; SUBCUTANEOUS at 06:04

## 2018-11-01 RX ADMIN — SODIUM CHLORIDE 3 MILLILITER(S): 9 INJECTION INTRAMUSCULAR; INTRAVENOUS; SUBCUTANEOUS at 21:38

## 2018-11-01 RX ADMIN — SIMVASTATIN 20 MILLIGRAM(S): 20 TABLET, FILM COATED ORAL at 21:47

## 2018-11-01 RX ADMIN — SODIUM CHLORIDE 75 MILLILITER(S): 9 INJECTION INTRAMUSCULAR; INTRAVENOUS; SUBCUTANEOUS at 17:56

## 2018-11-01 RX ADMIN — SODIUM CHLORIDE 3 MILLILITER(S): 9 INJECTION INTRAMUSCULAR; INTRAVENOUS; SUBCUTANEOUS at 13:25

## 2018-11-01 RX ADMIN — Medication 10 MILLIEQUIVALENT(S): at 12:12

## 2018-11-01 RX ADMIN — Medication 40 MILLIEQUIVALENT(S): at 21:47

## 2018-11-01 RX ADMIN — Medication 40 MILLIEQUIVALENT(S): at 12:31

## 2018-11-01 RX ADMIN — Medication 50 MILLIGRAM(S): at 05:49

## 2018-11-01 RX ADMIN — HEPARIN SODIUM 5000 UNIT(S): 5000 INJECTION INTRAVENOUS; SUBCUTANEOUS at 05:50

## 2018-11-01 RX ADMIN — Medication 40 MILLIEQUIVALENT(S): at 16:43

## 2018-11-01 RX ADMIN — Medication 25 MILLIGRAM(S): at 05:49

## 2018-11-01 NOTE — SWALLOW BEDSIDE ASSESSMENT ADULT - SWALLOW EVAL: DIAGNOSIS
Patient demonstrated a functional oral stage of swallow and a suspected mild pharyngeal dysphagia characterized by functional bolus collection, manipulation and transfer, a timely pharyngeal trigger, and suspect reduced hyolaryngeal elevation upon digital palpation resulting in an immediate cough post swallow with thin liquids suggestive of laryngeal penetration vs aspiration. No overt clinical s/s noted with puree, solids and nectar-thick liquids.

## 2018-11-01 NOTE — SWALLOW BEDSIDE ASSESSMENT ADULT - ASR SWALLOW ASPIRATION MONITOR
fever/pneumonia/oral hygiene/position upright (90Y)/throat clearing/upper respiratory infection/change of breathing pattern/cough/gurgly voice

## 2018-11-01 NOTE — SWALLOW BEDSIDE ASSESSMENT ADULT - COMMENTS
Patient was received awake, alert and cooperative this PM. Patient is an 86 year old female with PMHx of HTN, HLD, CVA with LUE weakness, breast ca s/p L mastectomy, presents with fall. CT: Head CT: No evidence for intracranial hemorrhage, mass effect, or displaced calvarial fracture.    Patient was received awake, alert and cooperative this AM. Patient reported difficulty swallowing whole pills and that she sometimes coughs with water. Recommendations discussed with primary RN. Tele PA paged; awaiting call back at this time.

## 2018-11-01 NOTE — SWALLOW BEDSIDE ASSESSMENT ADULT - SWALLOW EVAL: RECOMMENDED FEEDING/EATING TECHNIQUES
allow for swallow between intakes/maintain upright posture during/after eating for 30 mins/no straws/small sips/bites/position upright (90 degrees)

## 2018-11-02 VITALS
RESPIRATION RATE: 16 BRPM | OXYGEN SATURATION: 97 % | HEART RATE: 80 BPM | DIASTOLIC BLOOD PRESSURE: 75 MMHG | SYSTOLIC BLOOD PRESSURE: 135 MMHG | TEMPERATURE: 98 F

## 2018-11-02 LAB
BUN SERPL-MCNC: 10 MG/DL — SIGNIFICANT CHANGE UP (ref 7–23)
CALCIUM SERPL-MCNC: 8.6 MG/DL — SIGNIFICANT CHANGE UP (ref 8.4–10.5)
CHLORIDE SERPL-SCNC: 97 MMOL/L — LOW (ref 98–107)
CK SERPL-CCNC: 83 U/L — SIGNIFICANT CHANGE UP (ref 25–170)
CO2 SERPL-SCNC: 20 MMOL/L — LOW (ref 22–31)
CREAT SERPL-MCNC: 0.61 MG/DL — SIGNIFICANT CHANGE UP (ref 0.5–1.3)
GLUCOSE SERPL-MCNC: 126 MG/DL — HIGH (ref 70–99)
HCT VFR BLD CALC: 31.5 % — LOW (ref 34.5–45)
HGB BLD-MCNC: 9.9 G/DL — LOW (ref 11.5–15.5)
MAGNESIUM SERPL-MCNC: 1.8 MG/DL — SIGNIFICANT CHANGE UP (ref 1.6–2.6)
MCHC RBC-ENTMCNC: 25.8 PG — LOW (ref 27–34)
MCHC RBC-ENTMCNC: 31.4 % — LOW (ref 32–36)
MCV RBC AUTO: 82 FL — SIGNIFICANT CHANGE UP (ref 80–100)
NRBC # FLD: 0 — SIGNIFICANT CHANGE UP
PHOSPHATE SERPL-MCNC: 2.6 MG/DL — SIGNIFICANT CHANGE UP (ref 2.5–4.5)
PLATELET # BLD AUTO: 442 K/UL — HIGH (ref 150–400)
PMV BLD: 9.3 FL — SIGNIFICANT CHANGE UP (ref 7–13)
POTASSIUM SERPL-MCNC: 3.5 MMOL/L — SIGNIFICANT CHANGE UP (ref 3.5–5.3)
POTASSIUM SERPL-SCNC: 3.5 MMOL/L — SIGNIFICANT CHANGE UP (ref 3.5–5.3)
RBC # BLD: 3.84 M/UL — SIGNIFICANT CHANGE UP (ref 3.8–5.2)
RBC # FLD: 14.6 % — HIGH (ref 10.3–14.5)
SODIUM SERPL-SCNC: 135 MMOL/L — SIGNIFICANT CHANGE UP (ref 135–145)
WBC # BLD: 9.57 K/UL — SIGNIFICANT CHANGE UP (ref 3.8–10.5)
WBC # FLD AUTO: 9.57 K/UL — SIGNIFICANT CHANGE UP (ref 3.8–10.5)

## 2018-11-02 RX ORDER — SIMVASTATIN 20 MG/1
1 TABLET, FILM COATED ORAL
Qty: 0 | Refills: 0 | COMMUNITY

## 2018-11-02 RX ORDER — METOPROLOL TARTRATE 50 MG
1 TABLET ORAL
Qty: 0 | Refills: 0 | COMMUNITY

## 2018-11-02 RX ORDER — SIMVASTATIN 20 MG/1
0 TABLET, FILM COATED ORAL
Qty: 90 | Refills: 0 | COMMUNITY

## 2018-11-02 RX ORDER — SIMVASTATIN 20 MG/1
1 TABLET, FILM COATED ORAL
Qty: 0 | Refills: 0 | COMMUNITY
Start: 2018-11-02

## 2018-11-02 RX ORDER — METOPROLOL TARTRATE 50 MG
0 TABLET ORAL
Qty: 90 | Refills: 0 | COMMUNITY

## 2018-11-02 RX ORDER — METOPROLOL TARTRATE 50 MG
1 TABLET ORAL
Qty: 0 | Refills: 0 | COMMUNITY
Start: 2018-11-02

## 2018-11-02 RX ORDER — POTASSIUM CHLORIDE 20 MEQ
0 PACKET (EA) ORAL
Qty: 90 | Refills: 0 | COMMUNITY

## 2018-11-02 RX ADMIN — SODIUM CHLORIDE 75 MILLILITER(S): 9 INJECTION INTRAMUSCULAR; INTRAVENOUS; SUBCUTANEOUS at 12:02

## 2018-11-02 RX ADMIN — HEPARIN SODIUM 5000 UNIT(S): 5000 INJECTION INTRAVENOUS; SUBCUTANEOUS at 05:32

## 2018-11-02 RX ADMIN — SODIUM CHLORIDE 75 MILLILITER(S): 9 INJECTION INTRAMUSCULAR; INTRAVENOUS; SUBCUTANEOUS at 05:32

## 2018-11-02 RX ADMIN — SODIUM CHLORIDE 3 MILLILITER(S): 9 INJECTION INTRAMUSCULAR; INTRAVENOUS; SUBCUTANEOUS at 05:18

## 2018-11-02 RX ADMIN — Medication 25 MILLIGRAM(S): at 05:32

## 2018-11-02 RX ADMIN — Medication 50 MILLIGRAM(S): at 05:34

## 2018-11-02 RX ADMIN — INFLUENZA VIRUS VACCINE 0.5 MILLILITER(S): 15; 15; 15; 15 SUSPENSION INTRAMUSCULAR at 14:50

## 2018-11-02 RX ADMIN — Medication 10 MILLIEQUIVALENT(S): at 12:02

## 2018-11-02 RX ADMIN — SODIUM CHLORIDE 3 MILLILITER(S): 9 INJECTION INTRAMUSCULAR; INTRAVENOUS; SUBCUTANEOUS at 12:02

## 2018-11-02 NOTE — DISCHARGE NOTE ADULT - CARE PROVIDER_API CALL
Efren Zee (), Cardiology; Internal Medicine  [address] [place] [address]  [address]  [place] [address]  Phone: [phone]  Fax: [phone]

## 2018-11-02 NOTE — DISCHARGE NOTE ADULT - MEDICATION SUMMARY - MEDICATIONS TO TAKE
I will START or STAY ON the medications listed below when I get home from the hospital:    simvastatin 20 mg oral tablet  -- 1 tab(s) by mouth once a day (at bedtime)  -- Indication: For Hld    metoprolol succinate 50 mg oral tablet, extended release  -- 1 tab(s) by mouth once a day  -- Indication: For PAT    hydroCHLOROthiazide 25 mg oral tablet  -- 1 tab(s) by mouth once a day  -- Indication: For Htn    potassium chloride 10 mEq oral capsule, extended release  -- 1 cap(s) by mouth once a day  -- Indication: For supplement

## 2018-11-02 NOTE — DISCHARGE NOTE ADULT - CARE PLAN
Principal Discharge DX:	Fall  Goal:	To prevent or reduce the incidence of falls and injuries.  To increase mobility and function and environmental safety.  Assessment and plan of treatment:	Fall prevention. Follow up with PMD in 2-3 weeks. continue PT/OT  Secondary Diagnosis:	Hyperlipidemia  Goal:	To maintain normal cholesterol levels to prevent stroke, coronary artery disease, peripheral vascular disease and heart attacks.  Assessment and plan of treatment:	Low fat diet, exercise daily and continue current medications. Follow up with primary care physician and cardiologist for management.  Secondary Diagnosis:	Traumatic rhabdomyolysis, initial encounter  Goal:	normal CK  Assessment and plan of treatment:	your CK level has returned to normal after IV fluid. Follow up with PMD  Secondary Diagnosis:	Troponin level elevated  Assessment and plan of treatment:	Follow up with Dr Zee. You would need echocardiogram outpatient.

## 2018-11-02 NOTE — DISCHARGE NOTE ADULT - MEDICATION SUMMARY - MEDICATIONS TO STOP TAKING
I will STOP taking the medications listed below when I get home from the hospital:    METOPROLOL SUCCINATE ER 25 MG TB24    metoprolol succinate 25 mg oral tablet, extended release  -- 1 tab(s) by mouth once a day

## 2018-11-02 NOTE — DISCHARGE NOTE ADULT - PLAN OF CARE
To prevent or reduce the incidence of falls and injuries.  To increase mobility and function and environmental safety. Fall prevention. Follow up with PMD in 2-3 weeks. continue PT/OT To maintain normal cholesterol levels to prevent stroke, coronary artery disease, peripheral vascular disease and heart attacks. Low fat diet, exercise daily and continue current medications. Follow up with primary care physician and cardiologist for management. normal CK your CK level has returned to normal after IV fluid. Follow up with PMD Follow up with Dr Zee. You would need echocardiogram outpatient.

## 2018-11-02 NOTE — PROGRESS NOTE ADULT - ASSESSMENT
85 yo female h/o cva, htn, chol, breast ca admitted s/p fall with rhabdo  r/o syncope      no evid of acs  f/u echo  tele monitor  check orthostatics  cards f/u  brain ct neg    rhabdo  improving with ivf    cont other prior home meds    PT
85 y/o F with hx of HTN, HLD, CVA with LUE weakness, breast ca s/p L mastectomy presents with fall.
87 y/o F with hx of HTN, HLD, CVA with LUE weakness, breast ca s/p L mastectomy presents with fall.
87 yo female h/o cva, htn, chol, breast ca admitted s/p fall with rhabdo  r/o syncope    no evid of acs  f/u echo  tele monitor  cards f/u  brain ct neg    rhabdo  improving with ivf    cont other prior home meds    PT

## 2018-11-02 NOTE — DISCHARGE NOTE ADULT - PATIENT PORTAL LINK FT
You can access the [facility] Portal, offered by [facility], by registering with the following website: [email or web address]

## 2018-11-02 NOTE — PROGRESS NOTE ADULT - SUBJECTIVE AND OBJECTIVE BOX
FELIPE JOSEPH  86y Female  MRN:[id number]    Patient is a 86y old  Female who presents with a chief complaint of fall (30 Oct 2018 05:42)    HPI:  85 y/o F with hx of HTN, HLD, CVA with LUE weakness, breast ca s/p L mastectomy presents with fall. She got up from the couch without the walker and fell to the floor. She did not hit head or had LOC. She was unable to get up from there and stayed on the floor until the AM when her brother found her. Denies fever, chills, cough, chest pain, sob, abdominal pain, nausea, vomiting, melena, hematochezia, LE edema, or dysuria. (30 Oct 2018 05:42)      Patient seen and evaluated at bedside.      Interval HPI: no acute events o/n    PAST MEDICAL & SURGICAL HISTORY:  Breast cancer  Hyperlipidemia  Hypertension  Cerebrovascular accident (CVA)  After cataract  H/O left mastectomy    SOC:  non smoker  no alcohol or drug abuse    fam hx:  non cont    REVIEW OF SYSTEMS:  as per hpi    VITALS:  Vital Signs Last 24 Hrs  T(C): 36.9 (02 Nov 2018 13:58), Max: 37.1 (01 Nov 2018 21:47)  T(F): 98.4 (02 Nov 2018 13:58), Max: 98.8 (01 Nov 2018 21:47)  HR: 80 (02 Nov 2018 13:58) (79 - 88)  BP: 135/75 (02 Nov 2018 13:58) (127/66 - 145/72)  BP(mean): --  RR: 16 (02 Nov 2018 13:58) (16 - 17)  SpO2: 97% (02 Nov 2018 13:58) (96% - 98%)      PHYSICAL EXAM:  GENERAL: NAD, well-developed  HEAD:  Atraumatic, Normocephalic  EYES: EOMI, PERRLA, conjunctiva and sclera clear  NECK: Supple, No JVD  CHEST/LUNG: Clear to auscultation bilaterally; No wheeze  HEART: S1, S2; No murmurs, rubs, or gallops  ABDOMEN: Soft, Nontender, Nondistended; Bowel sounds present  EXTREMITIES:  2+ Peripheral Pulses, No clubbing, cyanosis, or edema  PSYCH: Normal affect  NEUROLOGY: AAOX3; LUE weakness  SKIN: No rashes or lesions    Consultant(s) Notes Reviewed:  [x ] YES  [ ] NO  Care Discussed with Consultants/Other Providers [ x] YES  [ ] NO    MEDS:  MEDICATIONS  (STANDING):  heparin  Injectable 5000 Unit(s) SubCutaneous every 12 hours  hydrochlorothiazide 25 milliGRAM(s) Oral daily  metoprolol succinate ER 50 milliGRAM(s) Oral daily  potassium chloride    Tablet ER 10 milliEquivalent(s) Oral daily  simvastatin 20 milliGRAM(s) Oral at bedtime  sodium chloride 0.9% lock flush 3 milliLiter(s) IV Push every 8 hours  sodium chloride 0.9%. 1000 milliLiter(s) (75 mL/Hr) IV Continuous <Continuous>    MEDICATIONS  (PRN):    ALLERGIES:  penicillin (Unknown)      LABS:                                               9.9    9.57  )-----------( 442      ( 02 Nov 2018 07:06 )             31.5   11-02    135  |  97<L>  |  10  ----------------------------<  126<H>  3.5   |  20<L>  |  0.61    Ca    8.6      02 Nov 2018 07:05  Phos  2.6     11-02  Mg     1.8     11-02
Subjective: Patient seen and examined. No new events except as noted.   resting comfortably in bed   no cp or sob       REVIEW OF SYSTEMS:    CONSTITUTIONAL: + weakness, fevers or chills  EYES/ENT: No visual changes;  No vertigo or throat pain   NECK: No pain or stiffness  RESPIRATORY: No cough, wheezing, hemoptysis; No shortness of breath  CARDIOVASCULAR: No chest pain or palpitations  GASTROINTESTINAL: No abdominal or epigastric pain. No nausea, vomiting, or hematemesis; No diarrhea or constipation. No melena or hematochezia.  GENITOURINARY: No dysuria, frequency or hematuria  NEUROLOGICAL: No numbness or weakness  SKIN: No itching, burning, rashes, or lesions   All other review of systems is negative unless indicated above.    MEDICATIONS:  MEDICATIONS  (STANDING):  heparin  Injectable 5000 Unit(s) SubCutaneous every 12 hours  hydrochlorothiazide 25 milliGRAM(s) Oral daily  influenza   Vaccine 0.5 milliLiter(s) IntraMuscular once  metoprolol succinate ER 50 milliGRAM(s) Oral daily  potassium chloride    Tablet ER 10 milliEquivalent(s) Oral daily  potassium chloride   Solution 40 milliEquivalent(s) Oral every 4 hours  simvastatin 20 milliGRAM(s) Oral at bedtime  sodium chloride 0.9% lock flush 3 milliLiter(s) IV Push every 8 hours  sodium chloride 0.9%. 1000 milliLiter(s) (75 mL/Hr) IV Continuous <Continuous>      PHYSICAL EXAM:  T(C): 36.9 (11-01-18 @ 05:36), Max: 36.9 (11-01-18 @ 05:36)  HR: 80 (11-01-18 @ 05:36) (68 - 95)  BP: 145/69 (11-01-18 @ 05:36) (142/82 - 157/69)  RR: 18 (11-01-18 @ 05:36) (16 - 18)  SpO2: 96% (11-01-18 @ 05:36) (96% - 99%)  Wt(kg): --  I&O's Summary    Height (cm): 137.16 (11-01 @ 05:36)    Appearance: NAD  HEENT:   dry oral mucosa, PERRL, EOMI	  Lymphatic: No lymphadenopathy , no edema  Cardiovascular: Normal S1 S2, No JVD, No murmurs , Peripheral pulses palpable 2+ bilaterally  Respiratory: Lungs clear to auscultation, normal effort 	  Gastrointestinal:  Soft, Non-tender, + BS	  Skin: No rashes, No ecchymoses, No cyanosis, warm to touch  Musculoskeletal: decreASED range of motion and strength  Psychiatry:  lethargic   Ext: No edema      LABS:    CARDIAC MARKERS:  CARDIAC MARKERS ( 31 Oct 2018 02:20 )  x     / x     / 364 u/L / x     / x      CARDIAC MARKERS ( 30 Oct 2018 05:30 )  x     / x     / 685 u/L / 10.23 ng/mL / x      CARDIAC MARKERS ( 29 Oct 2018 15:00 )  x     / x     / 870 u/L / 14.69 ng/mL / x                                    11.0   10.58 )-----------( 492      ( 01 Nov 2018 10:06 )             34.7     11-01    137  |  100  |  10  ----------------------------<  109<H>  3.0<L>   |  24  |  0.63    Ca    8.7      01 Nov 2018 06:55  Mg     2.0     10-31    TPro  6.9  /  Alb  3.3  /  TBili  0.4  /  DBili  0.1  /  AST  25  /  ALT  11  /  AlkPhos  96  10-31    proBNP:   Lipid Profile:   HgA1c:   TSH:     NEGATIVE          TELEMETRY: 	    ECG:  	  RADIOLOGY:   DIAGNOSTIC TESTING:  [ ] Echocardiogram:  [ ]  Catheterization:  [ ] Stress Test:    OTHER:
Subjective: Patient seen and examined. No new events except as noted.   weak   no cp or sob     REVIEW OF SYSTEMS:    CONSTITUTIONAL: + weakness, fevers or chills  EYES/ENT: No visual changes;  No vertigo or throat pain   NECK: No pain or stiffness  RESPIRATORY: No cough, wheezing, hemoptysis; No shortness of breath  CARDIOVASCULAR: No chest pain or palpitations  GASTROINTESTINAL: No abdominal or epigastric pain. No nausea, vomiting, or hematemesis; No diarrhea or constipation. No melena or hematochezia.  GENITOURINARY: No dysuria, frequency or hematuria  NEUROLOGICAL: No numbness or weakness  SKIN: No itching, burning, rashes, or lesions   All other review of systems is negative unless indicated above.    MEDICATIONS:  MEDICATIONS  (STANDING):  heparin  Injectable 5000 Unit(s) SubCutaneous every 12 hours  hydrochlorothiazide 25 milliGRAM(s) Oral daily  influenza   Vaccine 0.5 milliLiter(s) IntraMuscular once  metoprolol succinate ER 50 milliGRAM(s) Oral daily  potassium chloride    Tablet ER 10 milliEquivalent(s) Oral daily  simvastatin 20 milliGRAM(s) Oral at bedtime  sodium chloride 0.9% lock flush 3 milliLiter(s) IV Push every 8 hours  sodium chloride 0.9%. 1000 milliLiter(s) (75 mL/Hr) IV Continuous <Continuous>      PHYSICAL EXAM:  T(C): 36.7 (10-31-18 @ 15:32), Max: 37.4 (10-31-18 @ 05:55)  HR: 95 (10-31-18 @ 15:32) (94 - 110)  BP: 142/82 (10-31-18 @ 15:32) (121/67 - 151/85)  RR: 16 (10-31-18 @ 15:32) (16 - 18)  SpO2: 98% (10-31-18 @ 15:32) (95% - 98%)  Wt(kg): --  I&O's Summary      Weight (kg): 41.4 (10-31 @ 05:55)    Appearance: NAD	  HEENT:   Normal oral mucosa, PERRL, EOMI	  Lymphatic: No lymphadenopathy , no edema  Cardiovascular: Normal S1 S2, No JVD, No murmurs , Peripheral pulses palpable 2+ bilaterally  Respiratory: Lungs clear to auscultation, normal effort 	  Gastrointestinal:  Soft, Non-tender, + BS	  Skin: No rashes, No ecchymoses, No cyanosis, warm to touch  Musculoskeletal: Normal range of motion, normal strength  Psychiatry:  Mood & affect appropriate  Ext: No edema      LABS:    CARDIAC MARKERS:  CARDIAC MARKERS ( 31 Oct 2018 02:20 )  x     / x     / 364 u/L / x     / x      CARDIAC MARKERS ( 30 Oct 2018 05:30 )  x     / x     / 685 u/L / 10.23 ng/mL / x      CARDIAC MARKERS ( 29 Oct 2018 15:00 )  x     / x     / 870 u/L / 14.69 ng/mL / x                                    11.6   10.75 )-----------( 532      ( 31 Oct 2018 02:20 )             37.5     10-31    135  |  94<L>  |  13  ----------------------------<  115<H>  3.1<L>   |  21<L>  |  0.64    Ca    9.2      31 Oct 2018 02:20  Phos  3.0     10-30  Mg     2.0     10-31    TPro  6.9  /  Alb  3.3  /  TBili  0.4  /  DBili  0.1  /  AST  25  /  ALT  11  /  AlkPhos  96  10-31    proBNP:   Lipid Profile:   HgA1c:   TSH:     NEGATIVE          TELEMETRY: SR/PAT 	    ECG:  	  RADIOLOGY:   DIAGNOSTIC TESTING:  [ ] Echocardiogram:  [ ]  Catheterization:  [ ] Stress Test:    OTHER:
FELIPE JOSEPH  86y Female  MRN:[id number]    Patient is a 86y old  Female who presents with a chief complaint of fall (30 Oct 2018 05:42)    HPI:  87 y/o F with hx of HTN, HLD, CVA with LUE weakness, breast ca s/p L mastectomy presents with fall. She got up from the couch without the walker and fell to the floor. She did not hit head or had LOC. She was unable to get up from there and stayed on the floor until the AM when her brother found her. Denies fever, chills, cough, chest pain, sob, abdominal pain, nausea, vomiting, melena, hematochezia, LE edema, or dysuria. (30 Oct 2018 05:42)      Patient seen and evaluated at bedside.      Interval HPI: no acute events o/n    PAST MEDICAL & SURGICAL HISTORY:  Breast cancer  Hyperlipidemia  Hypertension  Cerebrovascular accident (CVA)  After cataract  H/O left mastectomy    SOC:  non smoker  no alcohol or drug abuse    fam hx:  non cont    REVIEW OF SYSTEMS:  as per hpi    VITALS:  Vital Signs Last 24 Hrs  T(C): 36.9 (01 Nov 2018 05:36), Max: 36.9 (01 Nov 2018 05:36)  T(F): 98.5 (01 Nov 2018 05:36), Max: 98.5 (01 Nov 2018 05:36)  HR: 80 (01 Nov 2018 05:36) (68 - 95)  BP: 145/69 (01 Nov 2018 05:36) (142/82 - 157/69)  BP(mean): --  RR: 18 (01 Nov 2018 05:36) (16 - 18)  SpO2: 96% (01 Nov 2018 05:36) (96% - 99%)        PHYSICAL EXAM:  GENERAL: NAD, well-developed  HEAD:  Atraumatic, Normocephalic  EYES: EOMI, PERRLA, conjunctiva and sclera clear  NECK: Supple, No JVD  CHEST/LUNG: Clear to auscultation bilaterally; No wheeze  HEART: S1, S2; No murmurs, rubs, or gallops  ABDOMEN: Soft, Nontender, Nondistended; Bowel sounds present  EXTREMITIES:  2+ Peripheral Pulses, No clubbing, cyanosis, or edema  PSYCH: Normal affect  NEUROLOGY: AAOX3; LUE weakness  SKIN: No rashes or lesions    Consultant(s) Notes Reviewed:  [x ] YES  [ ] NO  Care Discussed with Consultants/Other Providers [ x] YES  [ ] NO    MEDS:  MEDICATIONS  (STANDING):  heparin  Injectable 5000 Unit(s) SubCutaneous every 12 hours  hydrochlorothiazide 25 milliGRAM(s) Oral daily  influenza   Vaccine 0.5 milliLiter(s) IntraMuscular once  metoprolol succinate ER 50 milliGRAM(s) Oral daily  potassium chloride    Tablet ER 10 milliEquivalent(s) Oral daily  potassium chloride   Solution 40 milliEquivalent(s) Oral every 4 hours  simvastatin 20 milliGRAM(s) Oral at bedtime  sodium chloride 0.9% lock flush 3 milliLiter(s) IV Push every 8 hours  sodium chloride 0.9%. 1000 milliLiter(s) (75 mL/Hr) IV Continuous <Continuous>    MEDICATIONS  (PRN):    ALLERGIES:  penicillin (Unknown)      LABS:                                     11.0   10.58 )-----------( 492      ( 01 Nov 2018 10:06 )             34.7   11-01    137  |  100  |  10  ----------------------------<  109<H>  3.0<L>   |  24  |  0.63    Ca    8.7      01 Nov 2018 06:55  Mg     2.0     10-31    TPro  6.9  /  Alb  3.3  /  TBili  0.4  /  DBili  0.1  /  AST  25  /  ALT  11  /  AlkPhos  96  10-31

## 2018-11-02 NOTE — DISCHARGE NOTE ADULT - HOSPITAL COURSE
85 y/o F with hx of HTN, HLD, CVA with LUE weakness, breast ca s/p L mastectomy presents with fall. She got up from the couch without the walker and fell to the floor. She did not hit head or had LOC. She was unable to get up from there and stayed on the floor until the AM when her brother found her. Denies fever, chills, cough, chest pain, sob, abdominal pain, nausea, vomiting, melena, hematochezia, LE edema, or dysuria.     Hospital course:  CT: Head CT: No evidence for intracranial hemorrhage, mass effect, or displaced calvarial fracture.  Cervical spine CT: No evidence for acute displaced fracture or traumatic malalignment. Cervical degenerative spondylosis, as described above. MRI can be performed if there is concern for ligamentous or cord injury, and if there are no MRI contraindications.  Trop: 59 -->51---> 46  CK: 870----> 685  K: 3.3  WBC: 15.24    Pt presented with fall, orthostatic negative. CT with no evidence of fracture or intracranial bleed. PT with elevated trop, CK elevated in setting of fall. Low suspicion of ACS given EKG with no evidence of ischemia. Recommended TTE outpatient. Telemetry with episode of PAT, metoprolol uptitrated. Pt given IVF with improvement of CK. PT recommended rehab. As per Dr Zee pt cleared for discharge on 11/2 85 y/o F with hx of HTN, HLD, CVA with LUE weakness, breast ca s/p L mastectomy presents with fall. She got up from the couch without the walker and fell to the floor. She did not hit head or had LOC. She was unable to get up from there and stayed on the floor until the AM when her brother found her. Denies fever, chills, cough, chest pain, sob, abdominal pain, nausea, vomiting, melena, hematochezia, LE edema, or dysuria.     Hospital course:  CT: Head CT: No evidence for intracranial hemorrhage, mass effect, or displaced calvarial fracture.  Cervical spine CT: No evidence for acute displaced fracture or traumatic malalignment. Cervical degenerative spondylosis, as described above. MRI can be performed if there is concern for ligamentous or cord injury, and if there are no MRI contraindications.  Trop: 59 -->51---> 46  CK: 870----> 685  K: 3.3  WBC: 15.24    Pt presented with fall, orthostatic negative, low suspicion for infectious etiology to be cause given negative UCx and no white count. CT with no evidence of fracture or intracranial bleed. PT with elevated trop, CK elevated in setting of fall. Low suspicion of ACS given EKG with no evidence of ischemia. Recommended TTE outpatient. Telemetry with episode of PAT, metoprolol uptitrated. Pt given IVF with improvement of CK. PT recommended rehab. As per Dr Zee pt cleared for discharge on 11/2

## 2018-12-07 NOTE — PROGRESS NOTE ADULT - PROBLEM SELECTOR PROBLEM 5
Problem: Potential for Falls  Goal: Patient will remain free of falls  INTERVENTIONS:  - Assess patient frequently for physical needs  -  Identify cognitive and physical deficits and behaviors that affect risk of falls    -  [place] fall precautions as indicated by assessment   - Educate patient/family on patient safety including physical limitations  - Instruct patient to call for assistance with activity based on assessment  - Modify environment to reduce risk of injury  - Consider OT/PT consult to assist with strengthening/mobility   Outcome: Progressing
Hyperlipidemia
Hyperlipidemia

## 2024-08-28 NOTE — ED PROVIDER NOTE - CADM POA PRESS ULCER
[FreeTextEntry1] : General: Awake, Alert, No Acute Distress Respiratory: Normal Respiratory Effort.  Rectal: External examination shows no significant abnormalities.   No